# Patient Record
Sex: FEMALE | Race: OTHER | Employment: STUDENT | URBAN - METROPOLITAN AREA
[De-identification: names, ages, dates, MRNs, and addresses within clinical notes are randomized per-mention and may not be internally consistent; named-entity substitution may affect disease eponyms.]

---
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
## 2024-04-22 ENCOUNTER — PATIENT MESSAGE (OUTPATIENT)
Dept: FAMILY MEDICINE CLINIC | Facility: CLINIC | Age: 19
End: 2024-04-22

## 2024-04-22 DIAGNOSIS — Z79.3 DYSMENORRHEA TREATED WITH ORAL CONTRACEPTIVE: ICD-10-CM

## 2024-04-22 DIAGNOSIS — N94.6 DYSMENORRHEA TREATED WITH ORAL CONTRACEPTIVE: ICD-10-CM

## 2024-04-22 DIAGNOSIS — L70.0 ACNE VULGARIS: ICD-10-CM

## 2024-04-22 RX ORDER — DROSPIRENONE AND ETHINYL ESTRADIOL 0.02-3(28)
1 KIT ORAL DAILY
Qty: 28 TABLET | Refills: 11 | OUTPATIENT
Start: 2024-04-22 | End: 2025-04-17

## 2024-04-22 RX ORDER — DROSPIRENONE AND ETHINYL ESTRADIOL 0.02-3(28)
1 KIT ORAL DAILY
Qty: 28 TABLET | Refills: 2 | Status: SHIPPED | OUTPATIENT
Start: 2024-04-22

## 2024-04-22 NOTE — TELEPHONE ENCOUNTER
Requesting     Disp Refills Start End    Drospirenone-Ethinyl Estradiol (JONNY) 3-0.02 MG Oral Tab 28 tablet 11 6/6/2023 5/31/2024   Sig - Route: Take 1 tablet by mouth daily. - Oral   Sent to pharmacy as: Drospirenone-Ethinyl Estradiol 3-0.02 MG Oral Tablet (JONNY)     LOV: 6/6/2023 for acne  RTC: Return in about 3 months (around 9/6/2023).     Last Relevant Labs: 7/20/2023   Filled:     Dispensed Written Strength Quantity Refills Days Supply Provider Pharmacy    DROSPIRENONE/ETHY EST 3/0.02MG T 28 04/14/2024 06/06/2023  28 each  28 Faisal Moore DO [facility] #...       No future appointments.    Gynecology Medication Protocol [id number]/2024 01:44 PM    PASS-PENDING LAST PAP WNL--VIA MANUAL LOOKUP    Physical or Pelvic/Breast in past 12 or next 3 mos--VIA MANUAL LOOKUP        Pap not appropriate per pt's age  Rx sent for 3 months

## 2024-04-22 NOTE — TELEPHONE ENCOUNTER
From: Teresita Friedman  To: Faisal Moore  Sent: 4/22/2024 1:38 PM CDT  Subject: Refill Birth Control    Hello Dr. Moore,   I am currently on my last pack of birth control and I have no refills left. If you could please send more refills, that would be great.    Thank you

## 2024-05-21 ENCOUNTER — PATIENT MESSAGE (OUTPATIENT)
Dept: FAMILY MEDICINE CLINIC | Facility: CLINIC | Age: 19
End: 2024-05-21

## 2024-05-21 DIAGNOSIS — J30.2 SEASONAL ALLERGIES: Primary | ICD-10-CM

## 2024-05-21 NOTE — TELEPHONE ENCOUNTER
From: Teresita Friedman  To: Faisal Moore  Sent: 5/21/2024 11:08 AM CDT  Subject: Allergy Referral    Hi Dr. Moore,  Can you please put in a referral for my allergist? [place] allergist, Dr. Ritchie.

## 2024-07-01 ENCOUNTER — LAB ENCOUNTER (OUTPATIENT)
Dept: LAB | Age: 19
End: 2024-07-01
Attending: FAMILY MEDICINE
Payer: COMMERCIAL

## 2024-07-01 ENCOUNTER — OFFICE VISIT (OUTPATIENT)
Dept: FAMILY MEDICINE CLINIC | Facility: CLINIC | Age: 19
End: 2024-07-01
Payer: COMMERCIAL

## 2024-07-01 VITALS
OXYGEN SATURATION: 100 % | WEIGHT: 145 LBS | SYSTOLIC BLOOD PRESSURE: 110 MMHG | HEART RATE: 93 BPM | RESPIRATION RATE: 16 BRPM | BODY MASS INDEX: 24.75 KG/M2 | TEMPERATURE: 98 F | DIASTOLIC BLOOD PRESSURE: 70 MMHG | HEIGHT: 64 IN

## 2024-07-01 DIAGNOSIS — Z79.3 DYSMENORRHEA TREATED WITH ORAL CONTRACEPTIVE: ICD-10-CM

## 2024-07-01 DIAGNOSIS — Z00.00 WELL ADULT EXAM: Primary | ICD-10-CM

## 2024-07-01 DIAGNOSIS — Z11.3 SCREENING FOR STD (SEXUALLY TRANSMITTED DISEASE): ICD-10-CM

## 2024-07-01 DIAGNOSIS — Z11.3 ROUTINE SCREENING FOR STI (SEXUALLY TRANSMITTED INFECTION): ICD-10-CM

## 2024-07-01 DIAGNOSIS — Z00.00 WELL ADULT EXAM: ICD-10-CM

## 2024-07-01 DIAGNOSIS — L70.0 ACNE VULGARIS: ICD-10-CM

## 2024-07-01 DIAGNOSIS — N94.6 DYSMENORRHEA TREATED WITH ORAL CONTRACEPTIVE: ICD-10-CM

## 2024-07-01 LAB
BASOPHILS # BLD AUTO: 0.02 X10(3) UL (ref 0–0.2)
BASOPHILS NFR BLD AUTO: 0.3 %
EOSINOPHIL # BLD AUTO: 0.06 X10(3) UL (ref 0–0.7)
EOSINOPHIL NFR BLD AUTO: 0.9 %
ERYTHROCYTE [DISTWIDTH] IN BLOOD BY AUTOMATED COUNT: 11.8 %
HCT VFR BLD AUTO: 38.8 %
HGB BLD-MCNC: 12.9 G/DL
IMM GRANULOCYTES # BLD AUTO: 0.01 X10(3) UL (ref 0–1)
IMM GRANULOCYTES NFR BLD: 0.1 %
LYMPHOCYTES # BLD AUTO: 2.19 X10(3) UL (ref 1–4)
LYMPHOCYTES NFR BLD AUTO: 32.4 %
MCH RBC QN AUTO: 29.8 PG (ref 26–34)
MCHC RBC AUTO-ENTMCNC: 33.2 G/DL (ref 31–37)
MCV RBC AUTO: 89.6 FL
MONOCYTES # BLD AUTO: 0.48 X10(3) UL (ref 0.1–1)
MONOCYTES NFR BLD AUTO: 7.1 %
NEUTROPHILS # BLD AUTO: 3.99 X10 (3) UL (ref 1.5–7.7)
NEUTROPHILS # BLD AUTO: 3.99 X10(3) UL (ref 1.5–7.7)
NEUTROPHILS NFR BLD AUTO: 59.2 %
PLATELET # BLD AUTO: 267 10(3)UL (ref 150–450)
RBC # BLD AUTO: 4.33 X10(6)UL
T PALLIDUM AB SER QL IA: NONREACTIVE
WBC # BLD AUTO: 6.8 X10(3) UL (ref 4–11)

## 2024-07-01 PROCEDURE — 87491 CHLMYD TRACH DNA AMP PROBE: CPT

## 2024-07-01 PROCEDURE — 87591 N.GONORRHOEAE DNA AMP PROB: CPT

## 2024-07-01 PROCEDURE — 80061 LIPID PANEL: CPT

## 2024-07-01 PROCEDURE — 87389 HIV-1 AG W/HIV-1&-2 AB AG IA: CPT

## 2024-07-01 PROCEDURE — 85025 COMPLETE CBC W/AUTO DIFF WBC: CPT

## 2024-07-01 PROCEDURE — 36415 COLL VENOUS BLD VENIPUNCTURE: CPT

## 2024-07-01 PROCEDURE — 80053 COMPREHEN METABOLIC PANEL: CPT

## 2024-07-01 PROCEDURE — 86780 TREPONEMA PALLIDUM: CPT

## 2024-07-01 RX ORDER — DROSPIRENONE AND ETHINYL ESTRADIOL 0.02-3(28)
1 KIT ORAL DAILY
Qty: 84 TABLET | Refills: 3 | Status: SHIPPED | OUTPATIENT
Start: 2024-07-01

## 2024-07-01 NOTE — PROGRESS NOTES
Note to patient: The 21st Century Cures Act makes medical notes like these available to patients in the interest of transparency. However, be advised this is a medical document. It is intended as peer to peer communication. It is written in medical language and may contain abbreviations or verbiage that are unfamiliar. It may appear blunt or direct. Medical documents are intended to carry relevant information, facts as evident, and the clinical opinion of the practitioner.         Chief Complaint   Patient presents with    Physical       HPI:    Patient is here for her annual physical.     Acne/AUB:  Periods are regular.   Also has acne vulgaris. It's flaring on the chin but better now. Recommended to resume the clindamycin which she does not need refill on.   The periods last 4 days, moderate flow.   Denies menstrual cramps.  She denies spotting. Denies hx of HTN, obesity, DVT, migraines with aura, blood clotting disorder in the family.     Patient's last menstrual period was 06/14/2024 (exact date).      Smoking: none  Alcohol: occasionally.     Drugs: none   Sexual hx: 1 partner   STD hx: declined  Occupation: student - accounting.   Mammogram: aunt withhx of breast cancer  Colonoscopy: no fmhx of colon cancer  Pap smear: due at age 20.   Tdap: 2015  Diet: normal/healthy  Exercise: regularly.     Review of Systems   Constitutional: Negative for fever, chills and fatigue. No distress.  HENT: Negative for hearing loss, congestion, sore throat, neck pain   Eyes: Negative for pain and visual disturbance.   Respiratory: Negative for cough, chest tightness, shortness of breath and wheezing.    Cardiovascular: Negative for chest pain, palpitations and leg swelling.   Gastrointestinal: Negative for nausea, vomiting, abdominal pain, diarrhea    Patient Active Problem List   Diagnosis    Neuralgia    Contusion of left hand, initial encounter    Adjustment disorder with mixed disturbance of emotions and conduct    Acne  vulgaris       Past Medical History:    Acne    Allergic rhinitis     History reviewed. No pertinent surgical history.  Family History   Problem Relation Age of Onset    Other (HYPOTHYROI) Mother     Asthma Mother     Hypertension Maternal Grandmother     Stroke Maternal Grandmother     Cancer Paternal Grandmother     Cancer Sister      Social History     Socioeconomic History    Marital status: Single   Tobacco Use    Smoking status: Never     Passive exposure: Never    Smokeless tobacco: Never   Vaping Use    Vaping status: Never Used   Substance and Sexual Activity    Alcohol use: Never    Drug use: Never   Other Topics Concern    Caffeine Concern No    Exercise No    Seat Belt No    Special Diet No    Stress Concern No    Weight Concern No       Current Outpatient Medications   Medication Sig Dispense Refill    Drospirenone-Ethinyl Estradiol (JONNY) 3-0.02 MG Oral Tab Take 1 tablet by mouth daily. 28 tablet 2       Allergies  Allergies   Allergen Reactions    Benzoyl Peroxide SWELLING       Health Maintenance  Immunizations:  Immunization History   Administered Date(s) Administered    Covid-19 Vaccine Pfizer 30 mcg/0.3 ml 07/20/2021, 08/10/2021    DTAP INFANRIX 04/08/2004, 06/18/2004, 08/20/2004, 08/19/2005    DTAP-IPV 06/11/2009    HEP A,Ped/Adol,(2 Dose) 08/19/2019, 08/27/2020    Hep B, Unspecified Formulation 04/08/2004, 06/18/2004, 08/20/2004    Hib, Unspecified Formulation 04/08/2004, 06/18/2004, 08/20/2004, 04/24/2006    IPV 04/08/2004, 06/18/2004, 08/20/2004    Influenza 04/08/2004, 06/18/2004, 08/20/2004, 04/24/2006    MMR 08/19/2005, 06/11/2009    Meningococcal B, Omv 07/13/2022, 11/23/2022    Meningococcal-Menactra 07/22/2015, 08/05/2021    Pneumococcal (Prevnar 7) 11/19/2004, 02/18/2005, 05/12/2005    TDAP 07/22/2015    Tb Intradermal Test 02/18/2005    Varicella 05/12/2005, 06/11/2009         Physical Exam  /70   Pulse 93   Temp 98.1 °F (36.7 °C) (Temporal)   Resp 16   Ht 5' 4\" (1.626 m)    Wt 145 lb (65.8 kg)   LMP 06/14/2024 (Exact Date)   SpO2 100%   BMI 24.89 kg/m²   Constitutional: Oriented to person, place, and time. No distress.   HEENT:  Normocephalic and atraumatic. Hearing and tympanic membranes normal.  Nose normal. Oropharynx is clear and moist.   Eyes: Conjunctivae and EOM are normal. PERRLA. No scleral icterus.   Neck:  No mass and no thyromegaly present.   Cardiovascular: Normal rate, regular rhythm and intact distal pulses.  No murmur, rubs or gallops.   Pulmonary/Chest: Effort normal and breath sounds normal. No respiratory distress. No wheezes, rhonchi or rales  Abdominal: Soft. Bowel sounds are normal. Non tender, no masses, no organomegaly or hernias.  Musculoskeletal: No edema and no tenderness.    Neurological: grossly normal   Skin: Skin is warm and dry.  Psychiatric: Normal mood and affect.     A/P:    Encounter Diagnoses   Name Primary?    Well adult exam Yes    Dysmenorrhea treated with oral contraceptive     Acne vulgaris     Routine screening for STI (sexually transmitted infection)     Screening for STD (sexually transmitted disease)      1. Well adult exam  - -Discussed diet and exercise, counseled on vaccine and screening guidelines.   - CBC With Differential With Platelet; Future  - Comp Metabolic Panel (14); Future  - Lipid Panel; Future    2. Dysmenorrhea treated with oral contraceptive  Started on OCP for hx of acne and contraceptive purposes.  Warned on risks of HTN, weight change, mood change, blood clots.      - Drospirenone-Ethinyl Estradiol (JONNY) 3-0.02 MG Oral Tab; Take 1 tablet by mouth daily.  Dispense: 84 tablet; Refill: 3    3. Acne vulgaris  - Drospirenone-Ethinyl Estradiol (JONNY) 3-0.02 MG Oral Tab; Take 1 tablet by mouth daily.  Dispense: 84 tablet; Refill: 3    4. Routine screening for STI (sexually transmitted infection)  - Chlamydia/Gc Amplification Urine; Future    5. Screening for STD (sexually transmitted disease)  - Chlamydia/Gc Amplification  Urine; Future  - HIV Ag/Ab Combo; Future  - T Pallidum Screening [place]; Future    No orders of the defined types were placed in this encounter.      Meds & Refills for this Visit:  Requested Prescriptions     Pending Prescriptions Disp Refills    Drospirenone-Ethinyl Estradiol (JONNY) 3-0.02 MG Oral Tab 84 tablet 3     Sig: Take 1 tablet by mouth daily.       Imaging & Consults:  None      No follow-ups on file.    There are no Patient Instructions on file for this visit.    All questions were answered and the patient understands the plan.     Faisal Moore DO        This note was prepared using Dragon Medical voice recognition dictation software. As a result errors may occur. When identified these errors have been corrected. While every attempt is made to correct errors during dictation discrepancies may still exist.      Note to patient: The 21st Century Cures Act makes medical notes like these available to patients in the interest of transparency. However, be advised this is a medical document. It is intended as peer to peer communication. It is written in medical language and may contain abbreviations or verbiage that are unfamiliar. It may appear blunt or direct. Medical documents are intended to carry relevant information, facts as evident, and the clinical opinion of the practitioner.

## 2024-07-02 LAB
ALBUMIN SERPL-MCNC: 3.7 G/DL (ref 3.4–5)
ALBUMIN/GLOB SERPL: 1 {RATIO} (ref 1–2)
ALP LIVER SERPL-CCNC: 31 U/L
ALT SERPL-CCNC: 17 U/L
ANION GAP SERPL CALC-SCNC: 8 MMOL/L (ref 0–18)
AST SERPL-CCNC: 15 U/L (ref 15–37)
BILIRUB SERPL-MCNC: 0.2 MG/DL (ref 0.1–2)
BUN BLD-MCNC: 13 MG/DL (ref 9–23)
C TRACH DNA SPEC QL NAA+PROBE: NEGATIVE
CALCIUM BLD-MCNC: 9.1 MG/DL (ref 8.5–10.1)
CHLORIDE SERPL-SCNC: 104 MMOL/L (ref 98–112)
CHOLEST SERPL-MCNC: 211 MG/DL (ref ?–200)
CO2 SERPL-SCNC: 24 MMOL/L (ref 21–32)
CREAT BLD-MCNC: 1 MG/DL
EGFRCR SERPLBLD CKD-EPI 2021: 83 ML/MIN/1.73M2 (ref 60–?)
FASTING PATIENT LIPID ANSWER: YES
FASTING STATUS PATIENT QL REPORTED: YES
GLOBULIN PLAS-MCNC: 3.8 G/DL (ref 2.8–4.4)
GLUCOSE BLD-MCNC: 103 MG/DL (ref 70–99)
HDLC SERPL-MCNC: 72 MG/DL (ref 40–59)
LDLC SERPL CALC-MCNC: 121 MG/DL (ref ?–100)
N GONORRHOEA DNA SPEC QL NAA+PROBE: NEGATIVE
NONHDLC SERPL-MCNC: 139 MG/DL (ref ?–130)
OSMOLALITY SERPL CALC.SUM OF ELEC: 282 MOSM/KG (ref 275–295)
POTASSIUM SERPL-SCNC: 4.1 MMOL/L (ref 3.5–5.1)
PROT SERPL-MCNC: 7.5 G/DL (ref 6.4–8.2)
SODIUM SERPL-SCNC: 136 MMOL/L (ref 136–145)
TRIGL SERPL-MCNC: 105 MG/DL (ref 30–149)
VLDLC SERPL CALC-MCNC: 18 MG/DL (ref 0–30)

## 2025-06-12 ENCOUNTER — PATIENT MESSAGE (OUTPATIENT)
Dept: FAMILY MEDICINE CLINIC | Facility: CLINIC | Age: 20
End: 2025-06-12

## 2025-06-12 DIAGNOSIS — J30.2 SEASONAL ALLERGIES: Primary | ICD-10-CM

## 2025-06-23 DIAGNOSIS — N94.6 DYSMENORRHEA TREATED WITH ORAL CONTRACEPTIVE: ICD-10-CM

## 2025-06-23 DIAGNOSIS — L70.0 ACNE VULGARIS: ICD-10-CM

## 2025-06-23 DIAGNOSIS — Z79.3 DYSMENORRHEA TREATED WITH ORAL CONTRACEPTIVE: ICD-10-CM

## 2025-06-26 RX ORDER — DROSPIRENONE AND ETHINYL ESTRADIOL 0.02-3(28)
1 KIT ORAL DAILY
Qty: 84 TABLET | Refills: 0 | Status: SHIPPED | OUTPATIENT
Start: 2025-06-26

## 2025-06-26 NOTE — TELEPHONE ENCOUNTER
Requesting OCP  LOV: 7/1/24 Physical  RTC: 1 year  Last Relevant Labs:   Filled: 7/1/24 #84 with 3 refills    Future Appointments   Date Time Provider Department Center   8/5/2025  2:30 PM Faisal Moore DO [facility] [address] [facility] [address]     Gynecology Medication Protocol Failed 06/26/2025 03:28 PM   Protocol Details   PASS-PENDING LAST PAP WNL--VIA MANUAL LOOKUP    Medication is active on med list    Physical or Pelvic/Breast in past 12 or next 3 mos--VIA MANUAL LOOKUP     Rx sent to pharmacy per protocol

## 2025-08-05 ENCOUNTER — OFFICE VISIT (OUTPATIENT)
Dept: FAMILY MEDICINE CLINIC | Facility: CLINIC | Age: 20
End: 2025-08-05

## 2025-08-05 VITALS
OXYGEN SATURATION: 99 % | WEIGHT: 137 LBS | DIASTOLIC BLOOD PRESSURE: 80 MMHG | HEIGHT: 64 IN | BODY MASS INDEX: 23.39 KG/M2 | SYSTOLIC BLOOD PRESSURE: 120 MMHG | RESPIRATION RATE: 16 BRPM | HEART RATE: 85 BPM | TEMPERATURE: 97 F

## 2025-08-05 DIAGNOSIS — Z12.4 ROUTINE CERVICAL SMEAR: ICD-10-CM

## 2025-08-05 DIAGNOSIS — N94.6 DYSMENORRHEA TREATED WITH ORAL CONTRACEPTIVE: ICD-10-CM

## 2025-08-05 DIAGNOSIS — Z12.4 SCREENING FOR CERVICAL CANCER: ICD-10-CM

## 2025-08-05 DIAGNOSIS — L70.0 ACNE VULGARIS: ICD-10-CM

## 2025-08-05 DIAGNOSIS — Z79.3 DYSMENORRHEA TREATED WITH ORAL CONTRACEPTIVE: ICD-10-CM

## 2025-08-05 DIAGNOSIS — Z00.00 WELL ADULT EXAM: Primary | ICD-10-CM

## 2025-08-05 DIAGNOSIS — Z11.3 ROUTINE SCREENING FOR STI (SEXUALLY TRANSMITTED INFECTION): ICD-10-CM

## 2025-08-05 DIAGNOSIS — L81.6 SKIN HYPOPIGMENTATION: ICD-10-CM

## 2025-08-05 PROCEDURE — 87491 CHLMYD TRACH DNA AMP PROBE: CPT | Performed by: FAMILY MEDICINE

## 2025-08-05 PROCEDURE — 88175 CYTOPATH C/V AUTO FLUID REDO: CPT | Performed by: FAMILY MEDICINE

## 2025-08-05 PROCEDURE — 87591 N.GONORRHOEAE DNA AMP PROB: CPT | Performed by: FAMILY MEDICINE

## 2025-08-05 PROCEDURE — 3074F SYST BP LT 130 MM HG: CPT | Performed by: FAMILY MEDICINE

## 2025-08-05 PROCEDURE — 99395 PREV VISIT EST AGE 18-39: CPT | Performed by: FAMILY MEDICINE

## 2025-08-05 PROCEDURE — 3079F DIAST BP 80-89 MM HG: CPT | Performed by: FAMILY MEDICINE

## 2025-08-05 PROCEDURE — 3008F BODY MASS INDEX DOCD: CPT | Performed by: FAMILY MEDICINE

## 2025-08-05 RX ORDER — DROSPIRENONE AND ETHINYL ESTRADIOL 0.02-3(28)
1 KIT ORAL DAILY
Qty: 84 TABLET | Refills: 2 | Status: SHIPPED | OUTPATIENT
Start: 2025-08-05

## 2025-08-06 ENCOUNTER — LAB ENCOUNTER (OUTPATIENT)
Dept: LAB | Age: 20
End: 2025-08-06
Attending: FAMILY MEDICINE

## 2025-08-06 DIAGNOSIS — Z00.00 WELL ADULT EXAM: ICD-10-CM

## 2025-08-06 LAB
ALBUMIN SERPL-MCNC: 4.7 G/DL (ref 3.2–4.8)
ALBUMIN/GLOB SERPL: 1.6 (ref 1–2)
ALP LIVER SERPL-CCNC: 28 U/L (ref 52–144)
ALT SERPL-CCNC: 9 U/L (ref 10–49)
ANION GAP SERPL CALC-SCNC: 12 MMOL/L (ref 0–18)
AST SERPL-CCNC: 20 U/L (ref ?–34)
BASOPHILS # BLD AUTO: 0.01 X10(3) UL (ref 0–0.2)
BASOPHILS NFR BLD AUTO: 0.2 %
BILIRUB SERPL-MCNC: 0.4 MG/DL (ref 0.3–1.2)
BUN BLD-MCNC: 11 MG/DL (ref 9–23)
C TRACH DNA SPEC QL NAA+PROBE: NEGATIVE
CALCIUM BLD-MCNC: 10 MG/DL (ref 8.7–10.6)
CHLORIDE SERPL-SCNC: 104 MMOL/L (ref 98–112)
CHOLEST SERPL-MCNC: 199 MG/DL (ref ?–200)
CO2 SERPL-SCNC: 25 MMOL/L (ref 21–32)
CREAT BLD-MCNC: 1.06 MG/DL (ref 0.55–1.02)
EGFRCR SERPLBLD CKD-EPI 2021: 77 ML/MIN/1.73M2 (ref 60–?)
EOSINOPHIL # BLD AUTO: 0.09 X10(3) UL (ref 0–0.7)
EOSINOPHIL NFR BLD AUTO: 1.7 %
ERYTHROCYTE [DISTWIDTH] IN BLOOD BY AUTOMATED COUNT: 11.9 %
FASTING PATIENT LIPID ANSWER: YES
FASTING STATUS PATIENT QL REPORTED: YES
GLOBULIN PLAS-MCNC: 2.9 G/DL (ref 2–3.5)
GLUCOSE BLD-MCNC: 93 MG/DL (ref 70–99)
HCT VFR BLD AUTO: 40.7 % (ref 35–48)
HDLC SERPL-MCNC: 74 MG/DL (ref 40–59)
HGB BLD-MCNC: 13.4 G/DL (ref 12–16)
IMM GRANULOCYTES # BLD AUTO: 0.01 X10(3) UL (ref 0–1)
IMM GRANULOCYTES NFR BLD: 0.2 %
LDLC SERPL CALC-MCNC: 103 MG/DL (ref ?–100)
LYMPHOCYTES # BLD AUTO: 2.45 X10(3) UL (ref 1–4)
LYMPHOCYTES NFR BLD AUTO: 45.1 %
MCH RBC QN AUTO: 30 PG (ref 26–34)
MCHC RBC AUTO-ENTMCNC: 32.9 G/DL (ref 31–37)
MCV RBC AUTO: 91.1 FL (ref 80–100)
MONOCYTES # BLD AUTO: 0.35 X10(3) UL (ref 0.1–1)
MONOCYTES NFR BLD AUTO: 6.4 %
N GONORRHOEA DNA SPEC QL NAA+PROBE: NEGATIVE
NEUTROPHILS # BLD AUTO: 2.52 X10 (3) UL (ref 1.5–7.7)
NEUTROPHILS # BLD AUTO: 2.52 X10(3) UL (ref 1.5–7.7)
NEUTROPHILS NFR BLD AUTO: 46.4 %
NONHDLC SERPL-MCNC: 125 MG/DL (ref ?–130)
OSMOLALITY SERPL CALC.SUM OF ELEC: 291 MOSM/KG (ref 275–295)
PLATELET # BLD AUTO: 266 10(3)UL (ref 150–450)
POTASSIUM SERPL-SCNC: 4.3 MMOL/L (ref 3.5–5.1)
PROT SERPL-MCNC: 7.6 G/DL (ref 5.7–8.2)
RBC # BLD AUTO: 4.47 X10(6)UL (ref 3.8–5.3)
SODIUM SERPL-SCNC: 141 MMOL/L (ref 136–145)
TRIGL SERPL-MCNC: 130 MG/DL (ref 30–149)
VLDLC SERPL CALC-MCNC: 22 MG/DL (ref 0–30)
WBC # BLD AUTO: 5.4 X10(3) UL (ref 4–11)

## 2025-08-06 PROCEDURE — 80061 LIPID PANEL: CPT

## 2025-08-06 PROCEDURE — 80053 COMPREHEN METABOLIC PANEL: CPT

## 2025-08-06 PROCEDURE — 85025 COMPLETE CBC W/AUTO DIFF WBC: CPT

## 2025-08-06 PROCEDURE — 36415 COLL VENOUS BLD VENIPUNCTURE: CPT

## (undated) NOTE — LETTER
Patient Name: Musa Verma  YOB: 2005          MRN number:  [id number]  Date:  4/28/2021  Referring Physician:  Pako Chandra         INITIAL EVALUATION:    Referring Physician: Dr. Jose Martinez  Diagnosis: Hyperextension injury of right e Strength/MMT: Bilateral UE myotomal testing 5/5, Shoulder ER 5/5, Shoulder IR 5/5, Rhomboids 3+/5, Middle Traps 3+/5, Lower traps 3+/5    Special tests: Varus/valgus stress tests negative    FOTO Score: 79/100    Today’s Treatment and Response: Reviewed pl

## (undated) NOTE — ED AVS SNAPSHOT
[facility] in [address] [place] [address] Drive,[address]    [address] [facility]    Phone:  [phone]    Fax:  [facility]   MRN: [id number]    Department:  [facility] END   Date of Visit:  5/18/2017 Insurance plans vary and the physician(s) referred by the Immediate Care may not be covered by your plan. Please contact your insurance company to determine coverage for follow-up care and referrals. [facility]  [address] If you have been prescribed any medication(s), please fill your prescription right away and begin taking the medication(s) as directed.     If the Immediate Care Provider has read X-rays, these will be re-interpreted by a radiologist.  If there is a signifi can help with your Affordable Care Act coverage, as well as all types of Medicaid plans. To get signed up and covered, please call [phone] and ask to get set up for an insurance coverage that is in-network with [facility].         Kavon

## (undated) NOTE — LETTER
[facility] IN [place]  [address] [address]  Dept: [phone]  Dept Fax: [phone]         May 18, 2017    Patient: Emir Sena   YOB: 2005   Date of Visit: 5/18/2017       To Whom It May Concern